# Patient Record
Sex: FEMALE | Race: WHITE | NOT HISPANIC OR LATINO | Employment: UNEMPLOYED | URBAN - METROPOLITAN AREA
[De-identification: names, ages, dates, MRNs, and addresses within clinical notes are randomized per-mention and may not be internally consistent; named-entity substitution may affect disease eponyms.]

---

## 2018-01-19 ENCOUNTER — APPOINTMENT (OUTPATIENT)
Dept: URGENT CARE | Facility: CLINIC | Age: 23
End: 2018-01-19
Payer: COMMERCIAL

## 2018-01-19 ENCOUNTER — GENERIC CONVERSION - ENCOUNTER (OUTPATIENT)
Dept: OTHER | Facility: OTHER | Age: 23
End: 2018-01-19

## 2018-01-19 PROCEDURE — 99203 OFFICE O/P NEW LOW 30 MIN: CPT

## 2018-01-24 VITALS
HEART RATE: 75 BPM | DIASTOLIC BLOOD PRESSURE: 78 MMHG | RESPIRATION RATE: 18 BRPM | BODY MASS INDEX: 21.35 KG/M2 | WEIGHT: 120.5 LBS | OXYGEN SATURATION: 99 % | SYSTOLIC BLOOD PRESSURE: 118 MMHG | TEMPERATURE: 99.5 F | HEIGHT: 63 IN

## 2019-09-06 ENCOUNTER — OFFICE VISIT (OUTPATIENT)
Dept: URGENT CARE | Facility: CLINIC | Age: 24
End: 2019-09-06
Payer: COMMERCIAL

## 2019-09-06 VITALS
SYSTOLIC BLOOD PRESSURE: 131 MMHG | WEIGHT: 133 LBS | BODY MASS INDEX: 24.48 KG/M2 | DIASTOLIC BLOOD PRESSURE: 86 MMHG | HEART RATE: 72 BPM | HEIGHT: 62 IN | TEMPERATURE: 98 F | RESPIRATION RATE: 16 BRPM | OXYGEN SATURATION: 100 %

## 2019-09-06 DIAGNOSIS — J06.9 VIRAL UPPER RESPIRATORY TRACT INFECTION: Primary | ICD-10-CM

## 2019-09-06 PROCEDURE — 99213 OFFICE O/P EST LOW 20 MIN: CPT | Performed by: EMERGENCY MEDICINE

## 2019-09-06 RX ORDER — PREDNISONE 10 MG/1
TABLET ORAL
Qty: 27 TABLET | Refills: 0 | Status: SHIPPED | OUTPATIENT
Start: 2019-09-06 | End: 2022-02-10

## 2019-09-06 NOTE — PROGRESS NOTES
[employer]        NAME: Rebecca Hernandez is a 26 y o  female  : 1995    MRN: [id number]  DATE: 2019  TIME: 11:13 AM    Assessment and Plan   Viral upper respiratory tract infection [J06 9]  1  Viral upper respiratory tract infection  predniSONE 10 mg tablet         Patient Instructions     Patient Instructions     You have been diagnosed with a Viral Upper Respiratory infection and your symptoms should resolve over the next 7 to 10 days with the treatments recommended today  If they do not, it is possible that you have developed a bacterial infection and you should return  If you were to take an antibiotic while you are still in the viral stage, you will not get better any faster, but could kill off the good germs in your body as well as make the germs in you resistant to the antibiotic  Take an expectorant - guaifenesin should be the only ingredient - during the day, and the cough suppressant (ex  Robitussin DM or Tessalon) if needed at night only  Take Zinc 12 5 to 15 mg every 2 - 3 hrs while awake for the next few days  You may take Cold Ezio (13 3 mg of Zinc) or split a 25 mg Zinc tablet or lozenge in two or a 50 mg into four to get the proper dose  The total daily dose of Zinc should exceed 75 mg per day  You may also take a decongestant like Sudafed, unless you have hypertension or cardiac disease  Hold any NSAIDs like Ibuprofen (Advil), Naprosyn (Aleve), etc while on steroids like Medrol or Prednisone  If you are diabetic, you should also adhere strictly to your diet and monitor your blood sugar closely while on the steroids as discussed  Upper Respiratory Infection   AMBULATORY CARE:   An upper respiratory infection  is also called a common cold  It can affect your nose, throat, ears, and sinuses  Common signs and symptoms include the following:  Cold symptoms are usually worst for the first 3 to 5 days   You may have any of the following:  · Runny or stuffy nose    · Sneezing and coughing    · Sore throat or hoarseness    · Red, watery, and sore eyes    · Fatigue     · Chills and fever    · Headache, body aches, or sore muscles  Seek care immediately if:   · You have chest pain or trouble breathing  Contact your healthcare provider if:   · You have a fever over 102ºF (39°C)  · Your sore throat gets worse or you see white or yellow spots in your throat  · Your symptoms get worse after 3 to 5 days or your cold is not better in 14 days  · You have a rash anywhere on your skin  · You have large, tender lumps in your neck  · You have thick, green or yellow drainage from your nose  · You cough up thick yellow, green, or bloody mucus  · You have vomiting for more than 24 hours and cannot keep fluids down  · You have a bad earache  · You have questions or concerns about your condition or care  Treatment for a cold: There is no cure for the common cold  Colds are caused by viruses and do not get better with antibiotics  Most people get better in 7 to 14 days  You may continue to cough for 2 to 3 weeks  The following may help decrease your symptoms:  · Decongestants  help reduce nasal congestion and help you breathe more easily  If you take decongestant pills, they may make you feel restless or not able to sleep  Do not use decongestant sprays for more than a few days  · Cough suppressants  help reduce coughing  Ask your healthcare provider which type of cough medicine is best for you  · NSAIDs , such as ibuprofen, help decrease swelling, pain, and fever  NSAIDs can cause stomach bleeding or kidney problems in certain people  If you take blood thinner medicine, always ask your healthcare provider if NSAIDs are safe for you  Always read the medicine label and follow directions  · Acetaminophen  decreases pain and fever  It is available without a doctor's order  Ask how much to take and how often to take it  Follow directions   Read the labels of all other medicines you are using to see if they also contain acetaminophen, or ask your doctor or pharmacist  Acetaminophen can cause liver damage if not taken correctly  Do not use more than 4 grams (4,000 milligrams) total of acetaminophen in one day  Manage your cold:   · Rest as much as possible  Slowly start to do more each day  · Drink more liquids as directed  Liquids will help thin and loosen mucus so you can cough it up  Liquids will also help prevent dehydration  Liquids that help prevent dehydration include water, fruit juice, and broth  Do not drink liquids that contain caffeine  Caffeine can increase your risk for dehydration  Ask your healthcare provider how much liquid to drink each day  · Soothe a sore throat  Gargle with warm salt water  This helps your sore throat feel better  Make salt water by dissolving ¼ teaspoon salt in 1 cup warm water  You may also suck on hard candy or throat lozenges  You may use a sore throat spray  · Use a humidifier or vaporizer  Use a cool mist humidifier or a vaporizer to increase air moisture in your home  This may make it easier for you to breathe and help decrease your cough  · Use saline nasal drops as directed  These help relieve congestion  · Apply petroleum-based jelly around the outside of your nostrils  This can decrease irritation from blowing your nose  · Do not smoke  Nicotine and other chemicals in cigarettes and cigars can make your symptoms worse  They can also cause infections such as bronchitis or pneumonia  Ask your healthcare provider for information if you currently smoke and need help to quit  E-cigarettes or smokeless tobacco still contain nicotine  Talk to your healthcare provider before you use these products  Prevent spreading your cold to others:   · Try to stay away from other people during the first 2 to 3 days of your cold when it is more easily spread  · Do not share food or drinks       · Do not share hand towels with household members  · Wash your hands often, especially after you blow your nose  Turn away from other people and cover your mouth and nose with a tissue when you sneeze or cough  Follow up with your healthcare provider as directed:  Write down your questions so you remember to ask them during your visits  © 2017 [facility] Information is for End User's use only and may not be sold, redistributed or otherwise used for commercial purposes  All illustrations and images included in CareNotes® are the copyrighted property of A D A M , Inc  or Alex Villagran  The above information is an  only  It is not intended as medical advice for individual conditions or treatments  Talk to your doctor, nurse or pharmacist before following any medical regimen to see if it is safe and effective for you  Follow up with PCP in 3-5 days  Proceed to  ER if symptoms worsen  Chief Complaint     Chief Complaint   Patient presents with    Sinus Problem     chest and sinus congestion x 3days         History of Present Illness       Patient complains of cough and congestion for past 3 days  She denies fever or chills  Review of Systems   Review of Systems   Constitutional: Negative for chills and fever  HENT: Positive for congestion, rhinorrhea, sinus pressure and sore throat  Negative for trouble swallowing and voice change  Respiratory: Positive for cough  Negative for chest tightness, shortness of breath and wheezing  Cardiovascular: Negative for chest pain           Current Medications       Current Outpatient Medications:     predniSONE 10 mg tablet, Take once daily all days pills on this schedule 6- 6- 5- 4- 3- 2- 1, Disp: 27 tablet, Rfl: 0    Current Allergies     Allergies as of 09/06/2019    (No Known Allergies)            The following portions of the patient's history were reviewed and updated as appropriate: allergies, current medications, past family history, past medical history, past social history, past surgical history and problem list      Past Medical History:   Diagnosis Date    Known health problems: none        Past Surgical History:   Procedure Laterality Date    NO PAST SURGERIES         Family History   Problem Relation Age of Onset    No Known Problems Mother     No Known Problems Father          Medications have been verified  Objective   /86   Pulse 72   Temp 98 °F (36 7 °C) (Tympanic)   Resp 16   Ht 5' 2" (1 575 m)   Wt 60 3 kg (133 lb)   LMP 08/12/2019   SpO2 100%   BMI 24 33 kg/m²        Physical Exam     Physical Exam   Constitutional: She is oriented to person, place, and time  She appears well-developed and well-nourished  No distress  HENT:   Head: Normocephalic and atraumatic  Right Ear: Tympanic membrane and external ear normal    Left Ear: Tympanic membrane and external ear normal    Nose: Mucosal edema present  Mouth/Throat: Posterior oropharyngeal erythema present  No oropharyngeal exudate or tonsillar abscesses  Nasal mucosa congested, oropharynx mildly erythematous  Neck: Neck supple  Cardiovascular: Normal rate and regular rhythm  Pulmonary/Chest: Effort normal  No respiratory distress  She has no wheezes  She has no rales  Neurological: She is alert and oriented to person, place, and time  Skin: Skin is warm and dry  Psychiatric: She has a normal mood and affect  Her behavior is normal  Judgment and thought content normal    Nursing note and vitals reviewed

## 2019-09-06 NOTE — LETTER
September 6, 2019     Patient: Lucien Gardner   YOB: 1995   Date of Visit: 9/6/2019       To Whom It May Concern: It is my medical opinion that Lucien Gardner may return to work on 9/9/19  If you have any questions or concerns, please don't hesitate to call           Sincerely,        CARRINGTON MOCK    CC: No Recipients

## 2019-09-06 NOTE — PATIENT INSTRUCTIONS
You have been diagnosed with a Viral Upper Respiratory infection and your symptoms should resolve over the next 7 to 10 days with the treatments recommended today  If they do not, it is possible that you have developed a bacterial infection and you should return  If you were to take an antibiotic while you are still in the viral stage, you will not get better any faster, but could kill off the good germs in your body as well as make the germs in you resistant to the antibiotic  Take an expectorant - guaifenesin should be the only ingredient - during the day, and the cough suppressant (ex  Robitussin DM or Tessalon) if needed at night only  Take Zinc 12 5 to 15 mg every 2 - 3 hrs while awake for the next few days  You may take Cold Ezio (13 3 mg of Zinc) or split a 25 mg Zinc tablet or lozenge in two or a 50 mg into four to get the proper dose  The total daily dose of Zinc should exceed 75 mg per day  You may also take a decongestant like Sudafed, unless you have hypertension or cardiac disease  Hold any NSAIDs like Ibuprofen (Advil), Naprosyn (Aleve), etc while on steroids like Medrol or Prednisone  If you are diabetic, you should also adhere strictly to your diet and monitor your blood sugar closely while on the steroids as discussed  Upper Respiratory Infection   AMBULATORY CARE:   An upper respiratory infection  is also called a common cold  It can affect your nose, throat, ears, and sinuses  Common signs and symptoms include the following:  Cold symptoms are usually worst for the first 3 to 5 days  You may have any of the following:  · Runny or stuffy nose    · Sneezing and coughing    · Sore throat or hoarseness    · Red, watery, and sore eyes    · Fatigue     · Chills and fever    · Headache, body aches, or sore muscles  Seek care immediately if:   · You have chest pain or trouble breathing  Contact your healthcare provider if:   · You have a fever over 102ºF (39°C)      · Your sore throat gets worse or you see white or yellow spots in your throat  · Your symptoms get worse after 3 to 5 days or your cold is not better in 14 days  · You have a rash anywhere on your skin  · You have large, tender lumps in your neck  · You have thick, green or yellow drainage from your nose  · You cough up thick yellow, green, or bloody mucus  · You have vomiting for more than 24 hours and cannot keep fluids down  · You have a bad earache  · You have questions or concerns about your condition or care  Treatment for a cold: There is no cure for the common cold  Colds are caused by viruses and do not get better with antibiotics  Most people get better in 7 to 14 days  You may continue to cough for 2 to 3 weeks  The following may help decrease your symptoms:  · Decongestants  help reduce nasal congestion and help you breathe more easily  If you take decongestant pills, they may make you feel restless or not able to sleep  Do not use decongestant sprays for more than a few days  · Cough suppressants  help reduce coughing  Ask your healthcare provider which type of cough medicine is best for you  · NSAIDs , such as ibuprofen, help decrease swelling, pain, and fever  NSAIDs can cause stomach bleeding or kidney problems in certain people  If you take blood thinner medicine, always ask your healthcare provider if NSAIDs are safe for you  Always read the medicine label and follow directions  · Acetaminophen  decreases pain and fever  It is available without a doctor's order  Ask how much to take and how often to take it  Follow directions  Read the labels of all other medicines you are using to see if they also contain acetaminophen, or ask your doctor or pharmacist  Acetaminophen can cause liver damage if not taken correctly  Do not use more than 4 grams (4,000 milligrams) total of acetaminophen in one day  Manage your cold:   · Rest as much as possible  Slowly start to do more each day  · Drink more liquids as directed  Liquids will help thin and loosen mucus so you can cough it up  Liquids will also help prevent dehydration  Liquids that help prevent dehydration include water, fruit juice, and broth  Do not drink liquids that contain caffeine  Caffeine can increase your risk for dehydration  Ask your healthcare provider how much liquid to drink each day  · Soothe a sore throat  Gargle with warm salt water  This helps your sore throat feel better  Make salt water by dissolving ¼ teaspoon salt in 1 cup warm water  You may also suck on hard candy or throat lozenges  You may use a sore throat spray  · Use a humidifier or vaporizer  Use a cool mist humidifier or a vaporizer to increase air moisture in your home  This may make it easier for you to breathe and help decrease your cough  · Use saline nasal drops as directed  These help relieve congestion  · Apply petroleum-based jelly around the outside of your nostrils  This can decrease irritation from blowing your nose  · Do not smoke  Nicotine and other chemicals in cigarettes and cigars can make your symptoms worse  They can also cause infections such as bronchitis or pneumonia  Ask your healthcare provider for information if you currently smoke and need help to quit  E-cigarettes or smokeless tobacco still contain nicotine  Talk to your healthcare provider before you use these products  Prevent spreading your cold to others:   · Try to stay away from other people during the first 2 to 3 days of your cold when it is more easily spread  · Do not share food or drinks  · Do not share hand towels with household members  · Wash your hands often, especially after you blow your nose  Turn away from other people and cover your mouth and nose with a tissue when you sneeze or cough  Follow up with your healthcare provider as directed:  Write down your questions so you remember to ask them during your visits     © 2017 [facility] Information is for End User's use only and may not be sold, redistributed or otherwise used for commercial purposes  All illustrations and images included in CareNotes® are the copyrighted property of A D A M , Inc  or Alex Villagran  The above information is an  only  It is not intended as medical advice for individual conditions or treatments  Talk to your doctor, nurse or pharmacist before following any medical regimen to see if it is safe and effective for you

## 2022-02-10 ENCOUNTER — OFFICE VISIT (OUTPATIENT)
Dept: URGENT CARE | Facility: CLINIC | Age: 27
End: 2022-02-10
Payer: COMMERCIAL

## 2022-02-10 VITALS
SYSTOLIC BLOOD PRESSURE: 123 MMHG | HEIGHT: 62 IN | HEART RATE: 84 BPM | BODY MASS INDEX: 27.6 KG/M2 | OXYGEN SATURATION: 99 % | WEIGHT: 150 LBS | RESPIRATION RATE: 18 BRPM | TEMPERATURE: 97.6 F | DIASTOLIC BLOOD PRESSURE: 79 MMHG

## 2022-02-10 DIAGNOSIS — R51.9 ACUTE NONINTRACTABLE HEADACHE, UNSPECIFIED HEADACHE TYPE: ICD-10-CM

## 2022-02-10 DIAGNOSIS — G56.02 CARPAL TUNNEL SYNDROME OF LEFT WRIST: Primary | ICD-10-CM

## 2022-02-10 PROCEDURE — 99213 OFFICE O/P EST LOW 20 MIN: CPT

## 2022-02-10 RX ORDER — NAPROXEN 500 MG/1
500 TABLET ORAL 2 TIMES DAILY WITH MEALS
Qty: 20 TABLET | Refills: 0 | Status: SHIPPED | OUTPATIENT
Start: 2022-02-10 | End: 2022-03-25

## 2022-02-10 NOTE — LETTER
February 10, 2022     Patient: Paco Mason   YOB: 1995   Date of Visit: 2/10/2022       To Whom It May Concern: It is my medical opinion that Paco Mason may return to work on 2/12/2022  If you have any questions or concerns, please don't hesitate to call           Sincerely,        [email or web address]

## 2022-02-10 NOTE — PATIENT INSTRUCTIONS
Wear splint while sleeping and as needed  Take naproxen twice a day with food  Do not take any other over the counter ibuprofen, aleve, motrin  May take tylenol or excedrin as needed for migraines  Increase fluid intake  Ensure getting good quality sleep  Avoid phones/tablets/tv prior to bed  Try blue blockers glasses for computer screen time  Carpal Tunnel Syndrome   AMBULATORY CARE:   Carpal tunnel syndrome (CTS)  is a condition that causes pressure to build in the carpal tunnel  The carpal tunnel is a small area between bones and tissues in your wrist  Swelling in this area puts pressure on the median nerve  The median nerve controls muscles and feeling in the hand  Common signs and symptoms:   · Dull, sharp, or shooting pain in your hand    · Numbness, tingling, or a burning feeling in your thumb, first finger, and middle finger    · Arm pain that may extend to your shoulder    · Weakness in your hand    · Swelling in your hand    · Not being able to control how your hand moves, or you drop objects    Seek care immediately if:   · You suddenly lose feeling in your hand or fingers and you cannot move them  · Your hand suddenly changes color  Contact your healthcare provider if:   · Your symptoms get worse  · Your hand and fingers are so weak that you cannot grab, squeeze, or lift items  · You have questions or concerns about your condition or care  Treatment  may not be needed  If your symptoms continue or are severe, you may need any of the following:  · NSAIDs  may be recommended to decrease swelling and pain  NSAIDs are available without a doctor's order  Ask your healthcare provider which medicine is right for you and how much to take  Take as directed  NSAIDs can cause stomach bleeding or kidney problems if not taken correctly  If you take blood thinning medicine, always ask your healthcare provider if NSAIDs are safe for you  · Steroid injections  may help decrease pain and swelling  Steroid medicine is injected into the carpal tunnel  · Transcutaneous electric nerve stimulation  uses mild electrical impulses to help decrease your wrist pain  · Surgery  called decompression may be used to take pressure off of the median nerve in your wrist     Manage your symptoms:   · Apply ice to your wrist   Ice helps decrease swelling and pain in your wrist  Ice may also help prevent tissue damage  Use an ice pack, or put crushed ice in a plastic bag  Cover the ice pack with a towel  Place it on your wrist for 15 to 20 minutes every hour, or as directed  · Rest your hands  Let your hands rest for a short time between repetitive motions, such as typing  If you feel pain, stop what you are doing and gently massage your wrist and hand  · Get physical and occupational therapy, if directed  Physical therapists will show you ways to exercise and strengthen your wrist  Occupational therapists will show you safe ways to use your wrist while you do your usual activities  · Use a wrist splint as directed  A splint will keep your wrist straight or in a slightly bent position  A wrist splint decreases pressure on the median nerve by letting your wrist rest  You may need to wear the splint for up to 8 weeks  You may need to wear it at night  Follow up with your doctor as directed:  Write down your questions so you remember to ask them during your visits  © Copyright [employer] 2021 Information is for End User's use only and may not be sold, redistributed or otherwise used for commercial purposes  All illustrations and images included in CareNotes® are the copyrighted property of A D A M , Inc  or Thanh Champagne   The above information is an  only  It is not intended as medical advice for individual conditions or treatments  Talk to your doctor, nurse or pharmacist before following any medical regimen to see if it is safe and effective for you          Carpal Tunnel Syndrome Exercises   AMBULATORY CARE:   What you need to know about carpal tunnel syndrome (CTS) exercises:  CTS exercises can help relieve pain and increase your range of motion  Your healthcare provider or physical therapist can tell you how often to do the exercises  CTS exercises:   · Finger extensions:  Hold your fingers and thumb close together  Keep them straight  Put a rubber band around the outside of your fingers and thumb  Spread your fingers apart  Then slowly bring them together without letting the rubber band fall off  Repeat 40 times  · Wrist flexor stretch:  Hold your arm out straight  Grasp your fingers with your other hand  Slowly bend the fingers back (palm facing away) until you feel a stretch in your wrist  Hold for 10 seconds  Repeat 5 times  · Wrist extensor stretch:  Hold your arm out straight  Grasp your fingers with your other hand  Slowly bend the fingers and hand down (palm facing you) until you feel a stretch on top of your hand  Hold for 10 seconds  Repeat 5 times  · Wrist curls without weights:  Sit in a chair with your forearm resting on your thigh or a table  With your palm facing down, flex your wrist up 3 inches and slowly lower it  Turn your forearm over and repeat with your palm facing up  Do each exercise 20 times  · Shrugs:  Stand with your arms by your side  Lift your shoulders up to your ears and hold for 1 second  Then pull your shoulders back, pinching your shoulder blades together  Hold for 1 second  Then relax your shoulders  Repeat 20 times  © Copyright [address] 2021 Information is for End User's use only and may not be sold, redistributed or otherwise used for commercial purposes  All illustrations and images included in CareNotes® are the copyrighted property of [employer]  or [facility] Kala Champagne   The above information is an  only  It is not intended as medical advice for individual conditions or treatments   Talk to your doctor, nurse or pharmacist before following any medical regimen to see if it is safe and effective for you

## 2022-02-10 NOTE — PROGRESS NOTES
[facility]        NAME: Vasiliy Bishop is a 27 y o  female  : 1995    MRN: [id number]  DATE: February 10, 2022  TIME: 4:29 PM    Assessment and Plan   Carpal tunnel syndrome of left wrist [G56 02]  1  Carpal tunnel syndrome of left wrist  naproxen (Naprosyn) 500 mg tablet    Ambulatory Referral to [facility]   2  Acute nonintractable headache, unspecified headache type       Carpal tunnel versus Dequervian tenosynovitis of the left thumb  More probable carpal tunnel as patient has a typing job for the past 5 years is no working overtime for the past 2 weeks in which symptoms have worsened  She also has a positive Phalen sign and negative finkelstein on exam   Referral placed for family practice  Cock-up splint applied  Naproxen and rest     Patient Instructions     Wear splint while sleeping and as needed  Take naproxen twice a day with food  Do not take any other over the counter ibuprofen, aleve, motrin  May take tylenol or excedrin as needed for migraines  Increase fluid intake  Ensure getting good quality sleep  Avoid phones/tablets/tv prior to bed  Try blue blockers glasses for computer screen time  Follow up with PCP in 3-5 days  Proceed to  ER if symptoms worsen  Chief Complaint     Chief Complaint   Patient presents with    Wrist Pain     left wrist pain radiating to fingers; started 3 days ago    Headache     frequent headaches at night; started 2 weeks ago         History of Present Illness       Patient is a 45-year-old female with no significant past medical history who presents to the office today complaining of left wrist pain with tingling radiating into the left thumb  She states that her symptoms have been going on for about a week  She states she had similar symptoms about 1 year ago which lasted about 2 weeks and then resolved    She states that she has been working at her company for the past 5-6 years in which she is required to sit at a computer and type all day  She states that she has been pulling a lot of overtime this past week which she attributes leading up to the pain in her left wrist   She is right handed  She states that she took an over-the-counter ibuprofen which helped a little bit with the pain  Patient also complains of headaches that have been going on this week  She states she gets a headache approximately every other day which is relieved with Excedrin migraine, or ibuprofen  She denies any visual changes with the headache  She denies any nausea, travel, she denies any neck pain, she denies any fever  She states that she has been getting a decreased amount of sleep over the past week will the over time she has been working and she has required to look at the computer all day long to which she attributes to the headaches  She denies not have a history of migraines  The headaches are not awaken her from sleep nor prevent her from sleeping  Review of Systems   Review of Systems   Constitutional: Negative for activity change, appetite change, chills, fatigue and fever  Eyes: Negative for photophobia, pain and visual disturbance  Respiratory: Negative for cough and shortness of breath  Gastrointestinal: Negative for diarrhea, nausea and vomiting  Musculoskeletal: Negative for back pain, neck pain and neck stiffness  Neurological: Positive for headaches  Negative for dizziness, seizures, speech difficulty and light-headedness  Psychiatric/Behavioral: Negative for confusion and sleep disturbance  All other systems reviewed and are negative          Current Medications       Current Outpatient Medications:     naproxen (Naprosyn) 500 mg tablet, Take 1 tablet (500 mg total) by mouth 2 (two) times a day with meals for 10 days, Disp: 20 tablet, Rfl: 0    Current Allergies     Allergies as of 02/10/2022 - Reviewed 02/10/2022   Allergen Reaction Noted    Bee venom Hives 02/10/2022    Shellfish-derived products - food allergy Throat Swelling 02/10/2022            The following portions of the patient's history were reviewed and updated as appropriate: allergies, current medications, past family history, past medical history, past social history, past surgical history and problem list      Past Medical History:   Diagnosis Date    Known health problems: none        Past Surgical History:   Procedure Laterality Date    NO PAST SURGERIES         Family History   Problem Relation Age of Onset    No Known Problems Mother     No Known Problems Father          Medications have been verified  Objective   /79   Pulse 84   Temp 97 6 °F (36 4 °C)   Resp 18   Ht 5' 2" (1 575 m)   Wt 68 kg (150 lb)   SpO2 99%   BMI 27 44 kg/m²        Physical Exam     Physical Exam  Vitals and nursing note reviewed  Constitutional:       General: She is not in acute distress  Appearance: Normal appearance  She is normal weight  She is not ill-appearing or toxic-appearing  Eyes:      Extraocular Movements: Extraocular movements intact  Pupils: Pupils are equal, round, and reactive to light  Cardiovascular:      Rate and Rhythm: Normal rate and regular rhythm  Pulses: Normal pulses  Heart sounds: Normal heart sounds  Pulmonary:      Effort: Pulmonary effort is normal       Breath sounds: Normal breath sounds  Musculoskeletal:      Right wrist: Normal       Left wrist: No swelling, deformity, effusion, lacerations, tenderness, snuff box tenderness or crepitus  Normal range of motion  Normal pulse  Right hand: Normal       Left hand: Normal       Cervical back: No rigidity or tenderness  Comments: Positive Phalen sign: the left wrist    Indeterminate Tinel sign  Negative Finkelstein   Lymphadenopathy:      Cervical: No cervical adenopathy  Skin:     General: Skin is warm and dry  Neurological:      General: No focal deficit present  Mental Status: She is alert and oriented to person, place, and time  Mental status is at baseline  Psychiatric:         Mood and Affect: Mood normal          Behavior: Behavior normal          Thought Content:  Thought content normal          Judgment: Judgment normal

## 2022-03-25 ENCOUNTER — OFFICE VISIT (OUTPATIENT)
Dept: FAMILY MEDICINE CLINIC | Facility: CLINIC | Age: 27
End: 2022-03-25
Payer: COMMERCIAL

## 2022-03-25 VITALS
HEART RATE: 94 BPM | WEIGHT: 172 LBS | DIASTOLIC BLOOD PRESSURE: 72 MMHG | HEIGHT: 62 IN | SYSTOLIC BLOOD PRESSURE: 120 MMHG | BODY MASS INDEX: 31.65 KG/M2 | TEMPERATURE: 98.2 F | OXYGEN SATURATION: 99 %

## 2022-03-25 DIAGNOSIS — Z00.00 ANNUAL PHYSICAL EXAM: Primary | ICD-10-CM

## 2022-03-25 DIAGNOSIS — Z12.4 SCREENING FOR CERVICAL CANCER: ICD-10-CM

## 2022-03-25 DIAGNOSIS — F41.9 ANXIETY: ICD-10-CM

## 2022-03-25 DIAGNOSIS — F41.0 PANIC ATTACK: ICD-10-CM

## 2022-03-25 PROCEDURE — 3008F BODY MASS INDEX DOCD: CPT | Performed by: NURSE PRACTITIONER

## 2022-03-25 PROCEDURE — 99385 PREV VISIT NEW AGE 18-39: CPT | Performed by: NURSE PRACTITIONER

## 2022-03-25 PROCEDURE — 1036F TOBACCO NON-USER: CPT | Performed by: NURSE PRACTITIONER

## 2022-03-25 PROCEDURE — 3725F SCREEN DEPRESSION PERFORMED: CPT | Performed by: NURSE PRACTITIONER

## 2022-03-25 RX ORDER — HYDROXYZINE HYDROCHLORIDE 25 MG/1
25 TABLET, FILM COATED ORAL EVERY 6 HOURS PRN
Qty: 30 TABLET | Refills: 1 | Status: SHIPPED | OUTPATIENT
Start: 2022-03-25

## 2022-03-25 RX ORDER — ALBUTEROL SULFATE 90 UG/1
AEROSOL, METERED RESPIRATORY (INHALATION)
COMMUNITY
Start: 2021-12-03

## 2022-03-25 NOTE — PROGRESS NOTES
ADULT ANNUAL [facility]IMARY [facility]ARE    NAME: Aleena Trinidad  AGE: 27 y o  SEX: female  : 1995     DATE: 3/25/2022     Assessment and Plan:     Problem List Items Addressed This Visit     None      Visit Diagnoses     Anxiety    -  Primary    Relevant Medications    hydrOXYzine HCL (ATARAX) 25 mg tablet    Annual physical exam        Panic attack        Relevant Medications    hydrOXYzine HCL (ATARAX) 25 mg tablet    Screening for cervical cancer        BMI 31 0-31 9,adult              Immunizations and preventive care screenings were discussed with patient today  Appropriate education was printed on patient's after visit summary  Counseling:  · Exercise: the importance of regular exercise/physical activity was discussed  Recommend exercise 3-5 times per week for at least 30 minutes  Return in 4 weeks (on 2022)  Chief Complaint:     Chief Complaint   Patient presents with    Physical Exam    Anxiety     anxiety causes chest pressure and overheat  happening more often now due to job changes  has a hx of anxiety   Care Gap Request     BMI f/u due today      History of Present Illness:     Adult Annual Physical   Patient here for a comprehensive physical exam  The patient reports problems - see additional note  Diet and Physical Activity  · Diet/Nutrition: well balanced diet  · Exercise: no formal exercise  Depression Screening  PHQ-2/9 Depression Screening    Little interest or pleasure in doing things: 0 - not at all  Feeling down, depressed, or hopeless: 0 - not at all  PHQ-2 Score: 0  PHQ-2 Interpretation: Negative depression screen       General Health  · Sleep: sleeps poorly and reports working a lot of extra hours and no sleep  · Hearing: normal - bilateral   · Vision: no vision problems  · Dental: regular dental visits         /GYN Health  · Last menstrual period: monthrly  · Contraceptive method: oral contraceptives  · History of STDs?: no      Review of Systems:     Review of Systems     Review of Systems   Constitutional: Negative  HENT: Negative  Respiratory: Positive for chest tightness (See HPI)  Cardiovascular: Negative  Gastrointestinal: Negative  Neurological: Negative  Psychiatric/Behavioral: The patient is nervous/anxious  See HPI  All other systems reviewed and are negative       Past Medical History:     Past Medical History:   Diagnosis Date    Allergic N/A    Not sure of shellfish and bees    Known health problems: none       Past Surgical History:     Past Surgical History:   Procedure Laterality Date    NO PAST SURGERIES        Social History:     Social History     Socioeconomic History    Marital status: Single     Spouse name: None    Number of children: None    Years of education: None    Highest education level: None   Occupational History    None   Tobacco Use    Smoking status: Never Smoker    Smokeless tobacco: Never Used   Vaping Use    Vaping Use: Never used   Substance and Sexual Activity    Alcohol use: Never    Drug use: Never    Sexual activity: Not Currently     Partners: Male     Birth control/protection: Condom Male, Spermicide   Other Topics Concern    None   Social History Narrative    None     Social Determinants of Health     Financial Resource Strain: Not on file   Food Insecurity: Not on file   Transportation Needs: Not on file   Physical Activity: Not on file   Stress: Not on file   Social Connections: Not on file   Intimate Partner Violence: Not on file   Housing Stability: Not on file      Family History:     Family History   Problem Relation Age of Onset    No Known Problems Mother     No Known Problems Father     Heart disease Maternal Grandfather     Diabetes Maternal Grandfather     Cancer Maternal Grandmother       Current Medications:     Current Outpatient Medications   Medication Sig Dispense Refill    albuterol (PROVENTIL HFA,VENTOLIN HFA) 90 mcg/act inhaler Take by mouth      naproxen (Naprosyn) 500 mg tablet Take 1 tablet (500 mg total) by mouth 2 (two) times a day with meals for 10 days 20 tablet 0    hydrOXYzine HCL (ATARAX) 25 mg tablet Take 1 tablet (25 mg total) by mouth every 6 (six) hours as needed for anxiety 30 tablet 1     No current facility-administered medications for this visit  Allergies: Allergies   Allergen Reactions    Bee Venom Hives    Shellfish-Derived Products - Food Allergy Throat Swelling      Physical Exam:     /72 (BP Location: Left arm, Patient Position: Sitting, Cuff Size: Standard)   Pulse 94   Temp 98 2 °F (36 8 °C)   Ht 5' 2" (1 575 m)   Wt 78 kg (172 lb)   SpO2 99%   BMI 31 46 kg/m²     Physical Exam   Physical Exam  Vitals and nursing note reviewed  Constitutional:       Appearance: Normal appearance  She is normal weight  HENT:      Head: Normocephalic and atraumatic  Right Ear: Tympanic membrane, ear canal and external ear normal       Left Ear: Tympanic membrane, ear canal and external ear normal    Eyes:      Extraocular Movements: Extraocular movements intact  Conjunctiva/sclera: Conjunctivae normal       Pupils: Pupils are equal, round, and reactive to light  Cardiovascular:      Rate and Rhythm: Normal rate and regular rhythm  Heart sounds: Normal heart sounds  No murmur heard  No gallop  Pulmonary:      Effort: Pulmonary effort is normal  No respiratory distress  Breath sounds: Normal breath sounds  No wheezing or rales  Abdominal:      General: Abdomen is flat  Musculoskeletal:         General: Normal range of motion  Cervical back: Neck supple  Skin:     General: Skin is warm and dry  Neurological:      General: No focal deficit present  Mental Status: She is alert and oriented to person, place, and time  Mental status is at baseline  Cranial Nerves: No cranial nerve deficit     Psychiatric: Mood and Affect: Mood normal          Behavior: Behavior normal          Thought Content:  Thought content normal          Judgment: Judgment normal            DOT Lewis   [facility]

## 2022-03-25 NOTE — PROGRESS NOTES
Assessment/Plan:       Diagnoses and all orders for this visit:    Anxiety  -     hydrOXYzine HCL (ATARAX) 25 mg tablet; Take 1 tablet (25 mg total) by mouth every 6 (six) hours as needed for anxiety    Annual physical exam    Panic attack  -     hydrOXYzine HCL (ATARAX) 25 mg tablet; Take 1 tablet (25 mg total) by mouth every 6 (six) hours as needed for anxiety    Screening for cervical cancer    BMI 31 0-31 9,adult    Other orders  -     albuterol (PROVENTIL HFA,VENTOLIN HFA) 90 mcg/act inhaler; Take by mouth     Discussed medication options - she is agreeable to taking hydroxyzine as needed - will re-evaluate in 4 weeks  Subjective:      Patient ID: Marco Berry is a 27 y o  female  Here today for a new patient physical and to discuss anxiety  Reports a hx of anxiety which has recently increased over the past month due to job concerns  States of having panic attacks in which she feels chest pressure, hot flashes and overwhelmed  Anxiety  Symptoms include nervous/anxious behavior  BMI Counseling: Body mass index is 31 46 kg/m²  The BMI is above normal  Nutrition recommendations include encouraging healthy choices of fruits and vegetables  Rationale for BMI follow-up plan is due to patient being overweight or obese  Depression Screening and Follow-up Plan: Patient was screened for depression during today's encounter  They screened negative with a PHQ-2 score of 0  The following portions of the patient's history were reviewed and updated as appropriate: allergies, current medications, past family history, past medical history, past social history, past surgical history and problem list     Review of Systems   Constitutional: Negative  HENT: Negative  Respiratory: Positive for chest tightness (See HPI)  Cardiovascular: Negative  Gastrointestinal: Negative  Neurological: Negative  Psychiatric/Behavioral: The patient is nervous/anxious  See HPI      All other systems reviewed and are negative  Objective:      /72 (BP Location: Left arm, Patient Position: Sitting, Cuff Size: Standard)   Pulse 94   Temp 98 2 °F (36 8 °C)   Ht 5' 2" (1 575 m)   Wt 78 kg (172 lb)   SpO2 99%   BMI 31 46 kg/m²          Physical Exam  Vitals and nursing note reviewed  Constitutional:       Appearance: Normal appearance  She is normal weight  HENT:      Head: Normocephalic and atraumatic  Right Ear: Tympanic membrane, ear canal and external ear normal       Left Ear: Tympanic membrane, ear canal and external ear normal    Eyes:      Extraocular Movements: Extraocular movements intact  Conjunctiva/sclera: Conjunctivae normal       Pupils: Pupils are equal, round, and reactive to light  Cardiovascular:      Rate and Rhythm: Normal rate and regular rhythm  Heart sounds: Normal heart sounds  No murmur heard  No gallop  Pulmonary:      Effort: Pulmonary effort is normal  No respiratory distress  Breath sounds: Normal breath sounds  No wheezing or rales  Abdominal:      General: Abdomen is flat  Musculoskeletal:         General: Normal range of motion  Cervical back: Neck supple  Skin:     General: Skin is warm and dry  Neurological:      General: No focal deficit present  Mental Status: She is alert and oriented to person, place, and time  Mental status is at baseline  Cranial Nerves: No cranial nerve deficit  Psychiatric:         Mood and Affect: Mood normal          Behavior: Behavior normal          Thought Content:  Thought content normal          Judgment: Judgment normal

## 2022-03-25 NOTE — PATIENT INSTRUCTIONS
